# Patient Record
Sex: FEMALE | Race: BLACK OR AFRICAN AMERICAN | NOT HISPANIC OR LATINO | URBAN - METROPOLITAN AREA
[De-identification: names, ages, dates, MRNs, and addresses within clinical notes are randomized per-mention and may not be internally consistent; named-entity substitution may affect disease eponyms.]

---

## 2017-06-22 ENCOUNTER — EMERGENCY (EMERGENCY)
Facility: HOSPITAL | Age: 27
LOS: 1 days | End: 2017-06-22
Payer: MEDICAID

## 2017-06-22 PROCEDURE — 99284 EMERGENCY DEPT VISIT MOD MDM: CPT | Mod: 25

## 2018-05-24 ENCOUNTER — ASOB RESULT (OUTPATIENT)
Age: 28
End: 2018-05-24

## 2018-05-24 ENCOUNTER — APPOINTMENT (OUTPATIENT)
Dept: ANTEPARTUM | Facility: CLINIC | Age: 28
End: 2018-05-24
Payer: MEDICAID

## 2018-05-24 PROCEDURE — 76811 OB US DETAILED SNGL FETUS: CPT

## 2018-08-19 PROCEDURE — 99284 EMERGENCY DEPT VISIT MOD MDM: CPT | Mod: 25

## 2018-08-20 ENCOUNTER — OUTPATIENT (OUTPATIENT)
Dept: OUTPATIENT SERVICES | Facility: HOSPITAL | Age: 28
LOS: 1 days | Discharge: HOME | End: 2018-08-20
Payer: MEDICAID

## 2018-08-20 PROCEDURE — 76815 OB US LIMITED FETUS(S): CPT | Mod: 26

## 2018-09-11 ENCOUNTER — INPATIENT (INPATIENT)
Facility: HOSPITAL | Age: 28
LOS: 0 days | Discharge: HOME | DRG: 781 | End: 2018-09-12
Attending: STUDENT IN AN ORGANIZED HEALTH CARE EDUCATION/TRAINING PROGRAM | Admitting: STUDENT IN AN ORGANIZED HEALTH CARE EDUCATION/TRAINING PROGRAM
Payer: COMMERCIAL

## 2018-09-11 ENCOUNTER — OUTPATIENT (OUTPATIENT)
Dept: OUTPATIENT SERVICES | Facility: HOSPITAL | Age: 28
LOS: 1 days | Discharge: ANOTHER HOSPITAL | End: 2018-09-11
Payer: MEDICAID

## 2018-09-11 VITALS — HEIGHT: 63 IN | WEIGHT: 182.98 LBS

## 2018-09-11 DIAGNOSIS — O47.03 FALSE LABOR BEFORE 37 COMPLETED WEEKS OF GESTATION, THIRD TRIMESTER: ICD-10-CM

## 2018-09-11 LAB
ALLERGY+IMMUNOLOGY DIAG STUDY NOTE: SIGNIFICANT CHANGE UP
AMPHET UR-MCNC: NEGATIVE — SIGNIFICANT CHANGE UP
ANISOCYTOSIS BLD QL: SLIGHT — SIGNIFICANT CHANGE UP
APPEARANCE UR: CLEAR — SIGNIFICANT CHANGE UP
BARBITURATES UR SCN-MCNC: NEGATIVE — SIGNIFICANT CHANGE UP
BASOPHILS # BLD AUTO: 0 K/UL — SIGNIFICANT CHANGE UP (ref 0–0.2)
BASOPHILS NFR BLD AUTO: 0.2 % — SIGNIFICANT CHANGE UP (ref 0–2)
BENZODIAZ UR-MCNC: NEGATIVE — SIGNIFICANT CHANGE UP
BILIRUB UR-MCNC: NEGATIVE — SIGNIFICANT CHANGE UP
BLD GP AB SCN SERPL QL: ABNORMAL
COCAINE METAB.OTHER UR-MCNC: POSITIVE
COLOR SPEC: YELLOW — SIGNIFICANT CHANGE UP
DIFF PNL FLD: NEGATIVE — SIGNIFICANT CHANGE UP
DIR ANTIGLOB POLYSPECIFIC INTERPRETATION: SIGNIFICANT CHANGE UP
ELLIPTOCYTES BLD QL SMEAR: SLIGHT — SIGNIFICANT CHANGE UP
EOSINOPHIL # BLD AUTO: 0.1 K/UL — SIGNIFICANT CHANGE UP (ref 0–0.5)
EOSINOPHIL NFR BLD AUTO: 1.3 % — SIGNIFICANT CHANGE UP (ref 0–6)
EPI CELLS # UR: ABNORMAL
GLUCOSE UR QL: NEGATIVE MG/DL — SIGNIFICANT CHANGE UP
HCT VFR BLD CALC: 34.8 % — LOW (ref 37–47)
HGB BLD-MCNC: 10.1 G/DL — LOW (ref 12–16)
HIV 1 & 2 AB SERPL IA.RAPID: SIGNIFICANT CHANGE UP
KETONES UR-MCNC: NEGATIVE — SIGNIFICANT CHANGE UP
LEUKOCYTE ESTERASE UR-ACNC: ABNORMAL
LYMPHOCYTES # BLD AUTO: 2 K/UL — SIGNIFICANT CHANGE UP (ref 1–4.8)
LYMPHOCYTES # BLD AUTO: 21.9 % — SIGNIFICANT CHANGE UP (ref 20–55)
MACROCYTES BLD QL: SLIGHT — SIGNIFICANT CHANGE UP
MCHC RBC-ENTMCNC: 22.3 PG — LOW (ref 27–31)
MCHC RBC-ENTMCNC: 29 G/DL — LOW (ref 32–36)
MCV RBC AUTO: 77 FL — LOW (ref 81–99)
METHADONE UR-MCNC: NEGATIVE — SIGNIFICANT CHANGE UP
MICROCYTES BLD QL: SLIGHT — SIGNIFICANT CHANGE UP
MONOCYTES # BLD AUTO: 0.7 K/UL — SIGNIFICANT CHANGE UP (ref 0–0.8)
MONOCYTES NFR BLD AUTO: 7.3 % — SIGNIFICANT CHANGE UP (ref 3–10)
NEUTROPHILS # BLD AUTO: 6.3 K/UL — SIGNIFICANT CHANGE UP (ref 1.8–8)
NEUTROPHILS NFR BLD AUTO: 68.8 % — SIGNIFICANT CHANGE UP (ref 37–73)
NITRITE UR-MCNC: NEGATIVE — SIGNIFICANT CHANGE UP
OPIATES UR-MCNC: NEGATIVE — SIGNIFICANT CHANGE UP
OVALOCYTES BLD QL SMEAR: SLIGHT — SIGNIFICANT CHANGE UP
PCP SPEC-MCNC: SIGNIFICANT CHANGE UP
PCP UR-MCNC: NEGATIVE — SIGNIFICANT CHANGE UP
PH UR: 7 — SIGNIFICANT CHANGE UP (ref 5–8)
PLAT MORPH BLD: NORMAL — SIGNIFICANT CHANGE UP
PLATELET # BLD AUTO: 286 K/UL — SIGNIFICANT CHANGE UP (ref 150–400)
POIKILOCYTOSIS BLD QL AUTO: SLIGHT — SIGNIFICANT CHANGE UP
POLYCHROMASIA BLD QL SMEAR: SLIGHT — SIGNIFICANT CHANGE UP
PROT UR-MCNC: NEGATIVE MG/DL — SIGNIFICANT CHANGE UP
RBC # BLD: 4.52 M/UL — SIGNIFICANT CHANGE UP (ref 4.4–5.2)
RBC # FLD: 21.9 % — HIGH (ref 11–15.6)
RBC BLD AUTO: ABNORMAL
RBC CASTS # UR COMP ASSIST: NEGATIVE /HPF — SIGNIFICANT CHANGE UP (ref 0–4)
SCHISTOCYTES BLD QL AUTO: SLIGHT — SIGNIFICANT CHANGE UP
SP GR SPEC: 1 — LOW (ref 1.01–1.02)
THC UR QL: NEGATIVE — SIGNIFICANT CHANGE UP
TYPE + AB SCN PNL BLD: SIGNIFICANT CHANGE UP
UROBILINOGEN FLD QL: NEGATIVE MG/DL — SIGNIFICANT CHANGE UP
WBC # BLD: 9.2 K/UL — SIGNIFICANT CHANGE UP (ref 4.8–10.8)
WBC # FLD AUTO: 9.2 K/UL — SIGNIFICANT CHANGE UP (ref 4.8–10.8)
WBC UR QL: SIGNIFICANT CHANGE UP

## 2018-09-11 PROCEDURE — 76818 FETAL BIOPHYS PROFILE W/NST: CPT | Mod: 26

## 2018-09-11 PROCEDURE — 99253 IP/OBS CNSLTJ NEW/EST LOW 45: CPT | Mod: GC

## 2018-09-11 PROCEDURE — 99283 EMERGENCY DEPT VISIT LOW MDM: CPT

## 2018-09-11 PROCEDURE — 86077 PHYS BLOOD BANK SERV XMATCH: CPT

## 2018-09-11 RX ORDER — SODIUM CHLORIDE 9 MG/ML
1000 INJECTION, SOLUTION INTRAVENOUS ONCE
Qty: 0 | Refills: 0 | Status: DISCONTINUED | OUTPATIENT
Start: 2018-09-11 | End: 2018-09-12

## 2018-09-11 RX ORDER — OXYTOCIN 10 UNIT/ML
333.33 VIAL (ML) INJECTION
Qty: 20 | Refills: 0 | Status: DISCONTINUED | OUTPATIENT
Start: 2018-09-11 | End: 2018-09-12

## 2018-09-11 RX ORDER — CITRIC ACID/SODIUM CITRATE 300-500 MG
30 SOLUTION, ORAL ORAL ONCE
Qty: 0 | Refills: 0 | Status: DISCONTINUED | OUTPATIENT
Start: 2018-09-11 | End: 2018-09-12

## 2018-09-11 RX ORDER — SODIUM CHLORIDE 9 MG/ML
1000 INJECTION, SOLUTION INTRAVENOUS
Qty: 0 | Refills: 0 | Status: DISCONTINUED | OUTPATIENT
Start: 2018-09-11 | End: 2018-09-12

## 2018-09-11 RX ORDER — BUTORPHANOL TARTRATE 2 MG/ML
2 INJECTION, SOLUTION INTRAMUSCULAR; INTRAVENOUS ONCE
Qty: 0 | Refills: 0 | Status: DISCONTINUED | OUTPATIENT
Start: 2018-09-11 | End: 2018-09-11

## 2018-09-11 RX ADMIN — BUTORPHANOL TARTRATE 2 MILLIGRAM(S): 2 INJECTION, SOLUTION INTRAMUSCULAR; INTRAVENOUS at 22:54

## 2018-09-11 RX ADMIN — BUTORPHANOL TARTRATE 2 MILLIGRAM(S): 2 INJECTION, SOLUTION INTRAMUSCULAR; INTRAVENOUS at 23:05

## 2018-09-11 RX ADMIN — SODIUM CHLORIDE 125 MILLILITER(S): 9 INJECTION, SOLUTION INTRAVENOUS at 21:01

## 2018-09-11 RX ADMIN — Medication 12 MILLIGRAM(S): at 21:38

## 2018-09-11 RX ADMIN — SODIUM CHLORIDE 125 MILLILITER(S): 9 INJECTION, SOLUTION INTRAVENOUS at 22:00

## 2018-09-11 NOTE — PATIENT PROFILE OB - ABILITY TO HEAR (WITH HEARING AID OR HEARING APPLIANCE IF NORMALLY USED):
[address] [id number] [phone]          April 5, 2018    RE:  Zuri Reza                                                                                                                                                       [address] [place] [id number]            To whom it may concern:    Zuri Reza is under my professional care for    Carpal tunnel syndrome of right wrist  Postoperative visit         Cannot return to work at this time.  Office recheck in 3 weeks.    Sincerely,        Davonte Cooley MD     Adequate: hears normal conversation without difficulty

## 2018-09-12 DIAGNOSIS — O99.333 SMOKING (TOBACCO) COMPLICATING PREGNANCY, THIRD TRIMESTER: ICD-10-CM

## 2018-09-12 DIAGNOSIS — Z3A.35 35 WEEKS GESTATION OF PREGNANCY: ICD-10-CM

## 2018-09-12 DIAGNOSIS — Z67.91 UNSPECIFIED BLOOD TYPE, RH NEGATIVE: ICD-10-CM

## 2018-09-12 DIAGNOSIS — Z29.9 ENCOUNTER FOR PROPHYLACTIC MEASURES, UNSPECIFIED: ICD-10-CM

## 2018-09-12 DIAGNOSIS — O99.013 ANEMIA COMPLICATING PREGNANCY, THIRD TRIMESTER: ICD-10-CM

## 2018-09-12 DIAGNOSIS — O99.213 OBESITY COMPLICATING PREGNANCY, THIRD TRIMESTER: ICD-10-CM

## 2018-09-12 DIAGNOSIS — O99.323 DRUG USE COMPLICATING PREGNANCY, THIRD TRIMESTER: ICD-10-CM

## 2018-09-12 DIAGNOSIS — O47.03 FALSE LABOR BEFORE 37 COMPLETED WEEKS OF GESTATION, THIRD TRIMESTER: ICD-10-CM

## 2018-09-12 LAB
HBV SURFACE AG SERPL QL IA: SIGNIFICANT CHANGE UP
T PALLIDUM AB TITR SER: NEGATIVE — SIGNIFICANT CHANGE UP

## 2018-09-12 RX ORDER — FERROUS SULFATE 325(65) MG
325 TABLET ORAL DAILY
Qty: 0 | Refills: 0 | Status: DISCONTINUED | OUTPATIENT
Start: 2018-09-12 | End: 2018-09-12

## 2018-09-12 RX ORDER — SODIUM CHLORIDE 9 MG/ML
3 INJECTION INTRAMUSCULAR; INTRAVENOUS; SUBCUTANEOUS EVERY 8 HOURS
Qty: 0 | Refills: 0 | Status: DISCONTINUED | OUTPATIENT
Start: 2018-09-12 | End: 2018-09-12

## 2018-09-12 RX ADMIN — Medication 1 TABLET(S): at 19:54

## 2018-09-12 RX ADMIN — Medication 325 MILLIGRAM(S): at 19:54

## 2018-09-12 RX ADMIN — SODIUM CHLORIDE 3 MILLILITER(S): 9 INJECTION INTRAMUSCULAR; INTRAVENOUS; SUBCUTANEOUS at 19:31

## 2018-09-12 RX ADMIN — SODIUM CHLORIDE 125 MILLILITER(S): 9 INJECTION, SOLUTION INTRAVENOUS at 06:06

## 2018-09-12 NOTE — DISCHARGE NOTE ANTEPARTUM - CARE PLAN
Principal Discharge DX:	Premature uterine contractions causing threatened premature labor, third trimester  Goal:	Rapid Recovery  Assessment and plan of treatment:	S/P 2 shots of Betamethasone. Patient should transition to regular activity level. Resume regular diet. Patient should follow up with her OB for a checkup in 1 week. Patient should call her doctor sooner if she develops a fever or uncontrolled vaginal bleeding.  Secondary Diagnosis:	Cocaine use complicating pregnancy, third trimester  Goal:	Substance Abuse Prevention  Assessment and plan of treatment:	Pt counseled at lengths regarding the maternal and fetal risks and consequences of substance abuse with cocaine. She adamantly denies any use. Her urine tox was positive for cocaine metabolites.

## 2018-09-12 NOTE — CONSULT NOTE ADULT - ASSESSMENT
A 27year old  LMP 18 EDC 10/13/18 at Gestational Age 35 4/7 wks. She is a current cocaine and tobacco user. She has premature uterine contractions. She in Rh- unsensitized and overweight. She has anemia during pregnancy.

## 2018-09-12 NOTE — DISCHARGE NOTE ANTEPARTUM - HOSPITAL COURSE
Pt was transferred from [facility] for a non-reassuring fetal heart tracing, a BPP 4/10, and positive urine toxicology for cocaine use. She was also found to have pre-term contractions and was given two doses of steroids to promote fetal lung maturity.

## 2018-09-12 NOTE — CONSULT NOTE ADULT - PROBLEM SELECTOR RECOMMENDATION 3
Most likely due to cocaine use. No documented cervical change during hospitalization. In the event of active labor, patient will not be given tocolysis. Agree with current steroid treatment to promote fetal lung maturity.

## 2018-09-12 NOTE — CONSULT NOTE ADULT - PROBLEM SELECTOR RECOMMENDATION 9
Patient counseled on the effects of cocaine use in pregnancy. She was told that cocaine use is associated with birth defects. Patient stated that no birth defects were noted on fetal ultrasound examination. It has been associated with fetal growth restriction, abruptio placenta, premature rupture of membranes and still birth. Possible maternal complications include stroke, MI and sudden death. Patient advised not use cocaine during remainder of the pregnancy. Patient has no vaginal bleeding. Premature uterine contractions could be secondary to her cocaine use.

## 2018-09-12 NOTE — CONSULT NOTE ADULT - PROBLEM SELECTOR RECOMMENDATION 4
Current user. Advised to quit smoking during pregnancy. Patient told that tobacco use in pregnancy has been associated with low birth weight infants,  births, abruptio placenta and still births.

## 2018-09-12 NOTE — DISCHARGE NOTE ANTEPARTUM - CARE PROVIDER_API CALL
Jez Sterling), Obstetrics and Gynecology  [address] [place] [address]  Phone: [phone]  Fax: [phone]

## 2018-09-12 NOTE — DISCHARGE NOTE ANTEPARTUM - PATIENT PORTAL LINK FT
You can access the [facility] Patient Portal, offered by [facility], by registering with the following website: [email or web address]

## 2018-09-12 NOTE — CONSULT NOTE ADULT - SUBJECTIVE AND OBJECTIVE BOX
JOI GROSS  A 27year old  LMP 18   EDC 10/13/18 at Gestational Age      26 YO  at 35 4/7 wks sent from [facility] due to non-reassuring NST, premature contractions and BPP score of 4/8. Patient has a history of drug and alcohol use in current pregnancy. BPP on admission 6/10 and Utox positive for cocaine. Patient also has a history inadequate number of pre- care visits.  Patient has a history of iron deficiency anemia and is s/p 2 units PRBCs 2 weeks ago. Patient receives weekly iron infusions. Blood type A-, s/p Rhogam at 28 weeks. Current FHT is reassuring (Category I), contractions irregular. Patient is s/p 1 dose betamethasone, 2nd dose pending tonight.     REVIEW OF SYSTEMS:  CONSTITUTIONAL: No weakness, fevers or chills  EYES/ENT: No visual changes;  No vertigo or throat pain   NECK: No pain or stiffness  RESPIRATORY: No cough, wheezing, hemoptysis; No shortness of breath  CARDIOVASCULAR: No chest pain or palpitations  GASTROINTESTINAL: No abdominal or epigastric pain. No nausea, vomiting, or hematemesis; No diarrhea or constipation. No melena or hematochezia.  GENITOURINARY: No dysuria, frequency or hematuria  NEUROLOGICAL: No numbness or weakness  SKIN: No itching, burning, rashes, or lesions   All other review of systems is negative unless indicated above.    PAST MEDICAL & SURGICAL HISTORY:  Uterine fibroid  Anemia  No significant past surgical history      Home medications: PNV, weekly iron infusions    Allergies:  No Known Allergies    FAMILY HISTORY: Mother ( from [place]), Dad ( from stroke)    Social History: Denies ETOH, Current smoker and cocaine user     Past OB/GYN Hx: X2 first trimester pregnancy abortions     Vital Signs:  T(C): 36.7  HR: 61  BP: 123/81  RR: 16  BMI: 32.4    Physical Exam:  General: Adult female in NAD  CVS: RRR, +S1/S2, no murmurs  Lungs: CTAB, no wheezing, rhonchi or rales  Abdomen: soft, non-tender, gravid uterus  Pelvic: Deferred  Ext: No cyanosis, edema or calf tenderness, +2 pulses  Skin: No rashes or lesions on exposed skin  Neuro: Normal DTRs, grossly intact    Fetal heart tracing reviewed: FHR baseline 120bpm, with moderate variation and accelerations. No decelerations. Mild to moderate uterine contractions every 3-5 minutes apart.     Labs:                     10.1   9.2   )-----------( 286      ( 11 Sep 2018 22:03 )             34.8       MEDICATIONS  (STANDING):  citric acid/sodium citrate Solution 30 milliLiter(s) Oral once  ferrous    sulfate 325 milliGRAM(s) Oral daily  lactated ringers Bolus 1000 milliLiter(s) IV Bolus once  lactated ringers. 1000 milliLiter(s) (125 mL/Hr) IV Continuous <Continuous>  oxytocin Infusion 333.333 milliUNIT(s)/Min (1000 mL/Hr) IV Continuous <Continuous>  prenatal multivitamin 1 Tablet(s) Oral daily JOI GROSS  A 27year old  LMP 18   EDC 10/13/18 at Gestational Age      26 YO  at 35 4/7 wks sent from [facility] due to non-reassuring NST, premature contractions and BPP score of 4/8. Patient has a history of drug and alcohol use in current pregnancy. BPP on admission 6/10 and Utox positive for cocaine. Patient also has a history inadequate number of pre- care visits.  Patient has a history of iron deficiency anemia and is s/p 2 units PRBCs 2 weeks ago. Patient receives weekly iron infusions. Blood type A-, s/p Rhogam at 28 weeks. Current FHT is reassuring (Category I), contractions irregular. Patient is s/p 1 dose betamethasone, 2nd dose pending tonight.     REVIEW OF SYSTEMS:  CONSTITUTIONAL: No weakness, fevers or chills  EYES/ENT: No visual changes;  No vertigo or throat pain   NECK: No pain or stiffness  RESPIRATORY: No cough, wheezing, hemoptysis; No shortness of breath  CARDIOVASCULAR: No chest pain or palpitations  GASTROINTESTINAL: No abdominal or epigastric pain. No nausea, vomiting, or hematemesis; No diarrhea or constipation. No melena or hematochezia.  GENITOURINARY: No dysuria, frequency or hematuria  NEUROLOGICAL: No numbness or weakness  SKIN: No itching, burning, rashes, or lesions   All other review of systems is negative unless indicated above.    PAST MEDICAL & SURGICAL HISTORY:  Uterine fibroid  Anemia  No significant past surgical history      Home medications: PNV, weekly iron infusions    Allergies:  No Known Allergies    FAMILY HISTORY: Mother ( from [place]), Dad ( from stroke)    Social History: Denies ETOH, Current smoker and cocaine user     Past OB/GYN Hx: X2 first trimester pregnancy abortions     Vital Signs:  T(C): 36.7  HR: 61  BP: 123/81  RR: 16  BMI: 32.4    Physical Exam:  General: Adult female in NAD  CVS: RRR, +S1/S2, no murmurs  Lungs: CTAB, no wheezing, rhonchi or rales  Abdomen: soft, non-tender, gravid uterus  Pelvic: Deferred  Ext: No cyanosis, edema or calf tenderness, +2 pulses  Skin: No rashes or lesions on exposed skin  Neuro: Normal DTRs, grossly intact    Fetal heart tracing reviewed: FHR baseline 120bpm, with moderate variation and accelerations. No decelerations. Mild to moderate uterine contractions every 3-5 minutes apart.     Labs:                     10.1   9.2   )-----------( 286      ( 11 Sep 2018 22:03 )             34.8     Urine Microscopic-Add On (NC) (18 @ 22:03)    Epithelial Cells: Moderate    Red Blood Cell - Urine: Negative /HPF    White Blood Cell - Urine: 0-2    Methadone, Urine (18 @ 22:03)    Methadone, Urine: Negative    Cocaine Metabolite, Urine (18 @ 22:03)    Cocaine Metabolite, Urine: Positive    Type + Screen (18 @ 22:03)    Type + Screen: A NEG    Antibody Identification (18 @ 22:03)    Antibody Interpretation 1: Anti-D due to RhIG Admin      MEDICATIONS  (STANDING):  citric acid/sodium citrate Solution 30 milliLiter(s) Oral once  ferrous    sulfate 325 milliGRAM(s) Oral daily  lactated ringers Bolus 1000 milliLiter(s) IV Bolus once  lactated ringers. 1000 milliLiter(s) (125 mL/Hr) IV Continuous <Continuous>  oxytocin Infusion 333.333 milliUNIT(s)/Min (1000 mL/Hr) IV Continuous <Continuous>  prenatal multivitamin 1 Tablet(s) Oral daily JOI GROSS  A 27 year old  LMP 18 EDC 10/13/18 at 35 weeks and 4 days Gestational Age.    26 YO  at 35 4/7 wks sent from [facility] due to non-reassuring NST, premature contractions and BPP score of 4/8. Patient has a history of drug and alcohol use in current pregnancy. BPP on admission 6/10 and Utox positive for cocaine. Patient also has a history inadequate number of pre- care visits.  Patient has a history of iron deficiency anemia and is s/p 2 units PRBCs 2 weeks ago. Patient receives weekly iron infusions. Blood type A-, s/p Rhogam at 28 weeks. Current FHT is reassuring (Category I), contractions irregular. Patient is s/p 1 dose betamethasone, 2nd dose pending tonight.     REVIEW OF SYSTEMS:  CONSTITUTIONAL: No weakness, fevers or chills  EYES/ENT: No visual changes;  No vertigo or throat pain   NECK: No pain or stiffness  RESPIRATORY: No cough, wheezing, hemoptysis; No shortness of breath  CARDIOVASCULAR: No chest pain or palpitations  GASTROINTESTINAL: No abdominal or epigastric pain. No nausea, vomiting, or hematemesis; No diarrhea or constipation. No melena or hematochezia.  GENITOURINARY: No dysuria, frequency or hematuria  NEUROLOGICAL: No numbness or weakness  SKIN: No itching, burning, rashes, or lesions   All other review of systems is negative unless indicated above.    PAST MEDICAL & SURGICAL HISTORY:  Uterine fibroid  Anemia  No significant past surgical history    Home medications: PNV, weekly iron infusions    Allergies:  No Known Allergies    FAMILY HISTORY: Mother ( from [place]), Dad ( from stroke)    Social History: Denies ETOH, Current smoker and cocaine user     Past OB/GYN Hx: X2 first trimester pregnancy abortions     Vital Signs:  T(C): 36.7  HR: 61  BP: 123/81  RR: 16  BMI: 32.4    Physical Exam:  General: Adult female in NAD  CVS: RRR, +S1/S2, no murmurs  Lungs: CTAB, no wheezing, rhonchi or rales  Abdomen: soft, non-tender, gravid uterus  Pelvic: Deferred  Ext: No cyanosis, edema or calf tenderness, +2 pulses  Skin: No rashes or lesions on exposed skin  Neuro: Normal DTRs, grossly intact    Fetal heart tracing reviewed: FHR baseline 120bpm, with moderate variation and accelerations. No decelerations. Mild to moderate uterine contractions every 3-5 minutes apart.     Labs:                     10.1   9.2   )-----------( 286      ( 11 Sep 2018 22:03 )             34.8     Urine Microscopic-Add On (NC) (18 @ 22:03)    Epithelial Cells: Moderate    Red Blood Cell - Urine: Negative /HPF    White Blood Cell - Urine: 0-2    Methadone, Urine (18 @ 22:03)    Methadone, Urine: Negative    Cocaine Metabolite, Urine (18 @ 22:03)    Cocaine Metabolite, Urine: Positive    Type + Screen (18 @ 22:03)    Type + Screen: A NEG    Antibody Identification (18 @ 22:03)    Antibody Interpretation 1: Anti-D due to RhIG Admin      MEDICATIONS  (STANDING):  citric acid/sodium citrate Solution 30 milliLiter(s) Oral once  ferrous    sulfate 325 milliGRAM(s) Oral daily  lactated ringers Bolus 1000 milliLiter(s) IV Bolus once  lactated ringers. 1000 milliLiter(s) (125 mL/Hr) IV Continuous <Continuous>  oxytocin Infusion 333.333 milliUNIT(s)/Min (1000 mL/Hr) IV Continuous <Continuous>  prenatal multivitamin 1 Tablet(s) Oral daily

## 2018-09-12 NOTE — DISCHARGE NOTE ANTEPARTUM - PLAN OF CARE
Rapid Recovery S/P 2 shots of Betamethasone. Patient should transition to regular activity level. Resume regular diet. Patient should follow up with her OB for a checkup in 1 week. Patient should call her doctor sooner if she develops a fever or uncontrolled vaginal bleeding. Substance Abuse Prevention Pt counseled at lengths regarding the maternal and fetal risks and consequences of substance abuse with cocaine. She adamantly denies any use. Her urine tox was positive for cocaine metabolites.

## 2018-09-12 NOTE — CONSULT NOTE ADULT - PROBLEM SELECTOR RECOMMENDATION 8
Agree with current steroid treatment to promote fetal lung maturity. Fetal heart rate tracing is category I suggestive of healthy fetus at this time. OB US to be done today.

## 2018-09-12 NOTE — CONSULT NOTE ADULT - PROBLEM SELECTOR RECOMMENDATION 6
Obesity has been associated with an increased risk for  birth,  deliveries, operative complications and still births. Suggest continue close fetal and maternal monitoring during hospitalization.

## 2018-09-12 NOTE — DISCHARGE NOTE ANTEPARTUM - MEDICATION SUMMARY - MEDICATIONS TO TAKE
I will START or STAY ON the medications listed below when I get home from the hospital:    Prenatal Multivitamins with Folic Acid 1 mg oral tablet  -- 1 tab(s) by mouth once a day  -- Indication: For 35 weeks gestation of pregnancy

## 2018-09-12 NOTE — CONSULT NOTE ADULT - PROBLEM SELECTOR RECOMMENDATION 2
Patient told that anemia during pregnancy is associated with  birth and low birth weight infants. Recommends daily prenatal vitamins and iron supplementation during hospitalization.

## 2018-09-12 NOTE — CONSULT NOTE ADULT - PROBLEM SELECTOR RECOMMENDATION 5
Patient advised to receive postpartum Rhogam if infant is found to be RhD+ Received antepartum Rhogam. Patient advised to receive postpartum Rhogam if infant is found to be RhD+.

## 2018-09-13 LAB
CANDIDA AB TITR SER: SIGNIFICANT CHANGE UP
G VAGINALIS DNA SPEC QL NAA+PROBE: DETECTED
RUBV IGG SER-ACNC: 2.1 INDEX — SIGNIFICANT CHANGE UP
RUBV IGG SER-IMP: POSITIVE — SIGNIFICANT CHANGE UP
T VAGINALIS SPEC QL WET PREP: SIGNIFICANT CHANGE UP

## 2018-09-27 ENCOUNTER — OUTPATIENT (OUTPATIENT)
Dept: OUTPATIENT SERVICES | Facility: HOSPITAL | Age: 28
LOS: 1 days | End: 2018-09-27

## 2018-09-29 ENCOUNTER — OUTPATIENT (OUTPATIENT)
Dept: OUTPATIENT SERVICES | Facility: HOSPITAL | Age: 28
LOS: 1 days | End: 2018-09-29
Payer: MEDICAID

## 2018-09-29 ENCOUNTER — OUTPATIENT (OUTPATIENT)
Dept: OUTPATIENT SERVICES | Facility: HOSPITAL | Age: 28
LOS: 1 days | End: 2018-09-29

## 2018-09-29 PROCEDURE — 76815 OB US LIMITED FETUS(S): CPT | Mod: 26

## 2018-09-29 PROCEDURE — 76818 FETAL BIOPHYS PROFILE W/NST: CPT | Mod: 26

## 2018-10-09 ENCOUNTER — OUTPATIENT (OUTPATIENT)
Dept: OUTPATIENT SERVICES | Facility: HOSPITAL | Age: 28
LOS: 1 days | Discharge: HOME | End: 2018-10-09
Payer: MEDICAID

## 2018-10-09 PROCEDURE — 99284 EMERGENCY DEPT VISIT MOD MDM: CPT

## 2018-10-12 ENCOUNTER — OUTPATIENT (OUTPATIENT)
Dept: OUTPATIENT SERVICES | Facility: HOSPITAL | Age: 28
LOS: 1 days | Discharge: HOME | End: 2018-10-12
Payer: MEDICAID

## 2018-10-12 PROCEDURE — 99285 EMERGENCY DEPT VISIT HI MDM: CPT | Mod: 25

## 2018-10-16 ENCOUNTER — INPATIENT (INPATIENT)
Facility: HOSPITAL | Age: 28
LOS: 4 days | Discharge: HOME | End: 2018-10-21
Attending: OBSTETRICS & GYNECOLOGY | Admitting: OBSTETRICS & GYNECOLOGY
Payer: MEDICAID

## 2018-10-16 PROCEDURE — 76815 OB US LIMITED FETUS(S): CPT | Mod: 26

## 2018-12-26 PROCEDURE — 76817 TRANSVAGINAL US OBSTETRIC: CPT

## 2018-12-26 PROCEDURE — 86880 COOMBS TEST DIRECT: CPT

## 2018-12-26 PROCEDURE — 87800 DETECT AGNT MULT DNA DIREC: CPT

## 2018-12-26 PROCEDURE — 81001 URINALYSIS AUTO W/SCOPE: CPT

## 2018-12-26 PROCEDURE — 93975 VASCULAR STUDY: CPT

## 2018-12-26 PROCEDURE — 86900 BLOOD TYPING SEROLOGIC ABO: CPT

## 2018-12-26 PROCEDURE — 86870 RBC ANTIBODY IDENTIFICATION: CPT

## 2018-12-26 PROCEDURE — 87653 STREP B DNA AMP PROBE: CPT

## 2018-12-26 PROCEDURE — 76819 FETAL BIOPHYS PROFIL W/O NST: CPT

## 2018-12-26 PROCEDURE — 85027 COMPLETE CBC AUTOMATED: CPT

## 2018-12-26 PROCEDURE — 87340 HEPATITIS B SURFACE AG IA: CPT

## 2018-12-26 PROCEDURE — 86762 RUBELLA ANTIBODY: CPT

## 2018-12-26 PROCEDURE — 86780 TREPONEMA PALLIDUM: CPT

## 2018-12-26 PROCEDURE — 86703 HIV-1/HIV-2 1 RESULT ANTBDY: CPT

## 2018-12-26 PROCEDURE — 86901 BLOOD TYPING SEROLOGIC RH(D): CPT

## 2018-12-26 PROCEDURE — 76805 OB US >/= 14 WKS SNGL FETUS: CPT

## 2018-12-26 PROCEDURE — 80307 DRUG TEST PRSMV CHEM ANLYZR: CPT

## 2018-12-26 PROCEDURE — 36415 COLL VENOUS BLD VENIPUNCTURE: CPT

## 2018-12-26 PROCEDURE — 86850 RBC ANTIBODY SCREEN: CPT

## 2019-12-04 ENCOUNTER — EMERGENCY (EMERGENCY)
Facility: HOSPITAL | Age: 29
LOS: 1 days | End: 2019-12-04
Admitting: EMERGENCY MEDICINE
Payer: MEDICAID

## 2019-12-04 PROCEDURE — 99284 EMERGENCY DEPT VISIT MOD MDM: CPT

## 2020-07-23 ENCOUNTER — EMERGENCY (EMERGENCY)
Facility: HOSPITAL | Age: 30
LOS: 1 days | End: 2020-07-23
Admitting: EMERGENCY MEDICINE
Payer: MEDICAID

## 2020-07-23 PROCEDURE — 71045 X-RAY EXAM CHEST 1 VIEW: CPT | Mod: 26

## 2020-07-23 PROCEDURE — 99285 EMERGENCY DEPT VISIT HI MDM: CPT

## 2021-06-26 ENCOUNTER — EMERGENCY (EMERGENCY)
Facility: HOSPITAL | Age: 31
LOS: 1 days | End: 2021-06-26
Admitting: EMERGENCY MEDICINE
Payer: MEDICAID

## 2021-06-26 PROCEDURE — 99285 EMERGENCY DEPT VISIT HI MDM: CPT

## 2021-06-26 PROCEDURE — 99053 MED SERV 10PM-8AM 24 HR FAC: CPT

## 2021-06-27 PROCEDURE — 76801 OB US < 14 WKS SINGLE FETUS: CPT | Mod: 26

## 2021-09-15 ENCOUNTER — OUTPATIENT (OUTPATIENT)
Dept: OUTPATIENT SERVICES | Facility: HOSPITAL | Age: 31
LOS: 1 days | End: 2021-09-15

## 2021-09-22 ENCOUNTER — OUTPATIENT (OUTPATIENT)
Dept: INPATIENT UNIT | Facility: HOSPITAL | Age: 31
LOS: 1 days | End: 2021-09-22
Payer: COMMERCIAL

## 2021-09-22 PROCEDURE — L9996: CPT

## 2021-09-23 PROCEDURE — 99213 OFFICE O/P EST LOW 20 MIN: CPT

## 2021-09-23 PROCEDURE — 76815 OB US LIMITED FETUS(S): CPT | Mod: 26

## 2021-10-04 ENCOUNTER — EMERGENCY (EMERGENCY)
Facility: HOSPITAL | Age: 31
LOS: 1 days | End: 2021-10-04
Admitting: EMERGENCY MEDICINE
Payer: MEDICAID

## 2021-10-04 PROCEDURE — L9991: CPT

## 2022-01-18 ENCOUNTER — INPATIENT (INPATIENT)
Facility: HOSPITAL | Age: 32
LOS: 1 days | Discharge: HOME | End: 2022-01-20
Attending: OBSTETRICS & GYNECOLOGY | Admitting: OBSTETRICS & GYNECOLOGY

## 2022-01-18 ENCOUNTER — OUTPATIENT (OUTPATIENT)
Dept: INPATIENT UNIT | Facility: HOSPITAL | Age: 32
LOS: 1 days | End: 2022-01-18
Payer: MEDICAID

## 2022-01-18 PROCEDURE — L9996: CPT

## 2022-01-27 DIAGNOSIS — U07.1 COVID-19: ICD-10-CM

## 2022-01-27 DIAGNOSIS — O09.33 SUPERVISION OF PREGNANCY WITH INSUFFICIENT ANTENATAL CARE, THIRD TRIMESTER: ICD-10-CM

## 2022-01-27 DIAGNOSIS — Z3A.38 38 WEEKS GESTATION OF PREGNANCY: ICD-10-CM

## 2022-01-27 DIAGNOSIS — D64.9 ANEMIA, UNSPECIFIED: ICD-10-CM

## 2022-08-25 ENCOUNTER — APPOINTMENT (OUTPATIENT)
Dept: OBGYN | Facility: CLINIC | Age: 32
End: 2022-08-25

## 2022-09-14 VITALS — BODY MASS INDEX: 33.66 KG/M2 | WEIGHT: 190 LBS | HEIGHT: 63 IN

## 2022-09-14 DIAGNOSIS — Z86.018 PERSONAL HISTORY OF OTHER BENIGN NEOPLASM: ICD-10-CM

## 2022-09-14 DIAGNOSIS — Z01.818 ENCOUNTER FOR OTHER PREPROCEDURAL EXAMINATION: ICD-10-CM

## 2022-09-20 LAB
BASOPHILS # BLD AUTO: 0.02 K/UL
BASOPHILS NFR BLD AUTO: 0.3 %
EOSINOPHIL # BLD AUTO: 0.53 K/UL
EOSINOPHIL NFR BLD AUTO: 7.9 %
HCT VFR BLD CALC: 29.7 %
HGB BLD-MCNC: 9 G/DL
IMM GRANULOCYTES NFR BLD AUTO: 0.3 %
LYMPHOCYTES # BLD AUTO: 1.63 K/UL
LYMPHOCYTES NFR BLD AUTO: 24.4 %
MAN DIFF?: NORMAL
MCHC RBC-ENTMCNC: 22.9 PG
MCHC RBC-ENTMCNC: 30.3 GM/DL
MCV RBC AUTO: 75.6 FL
MONOCYTES # BLD AUTO: 0.51 K/UL
MONOCYTES NFR BLD AUTO: 7.6 %
NEUTROPHILS # BLD AUTO: 3.98 K/UL
NEUTROPHILS NFR BLD AUTO: 59.5 %
PLATELET # BLD AUTO: 353 K/UL
RBC # BLD: 3.93 M/UL
RBC # FLD: 17.3 %
SARS-COV-2 N GENE NPH QL NAA+PROBE: NOT DETECTED
WBC # FLD AUTO: 6.69 K/UL

## 2022-09-21 ENCOUNTER — TRANSCRIPTION ENCOUNTER (OUTPATIENT)
Age: 32
End: 2022-09-21

## 2022-09-21 ENCOUNTER — APPOINTMENT (OUTPATIENT)
Dept: OBGYN | Facility: CLINIC | Age: 32
End: 2022-09-21
Payer: MEDICAID

## 2022-09-21 VITALS
WEIGHT: 190 LBS | DIASTOLIC BLOOD PRESSURE: 84 MMHG | HEIGHT: 63 IN | SYSTOLIC BLOOD PRESSURE: 128 MMHG | BODY MASS INDEX: 33.66 KG/M2 | HEART RATE: 87 BPM | OXYGEN SATURATION: 98 %

## 2022-09-21 DIAGNOSIS — R11.0 NAUSEA: ICD-10-CM

## 2022-09-21 DIAGNOSIS — D64.9 ANEMIA, UNSPECIFIED: ICD-10-CM

## 2022-09-21 DIAGNOSIS — Z30.09 ENCOUNTER FOR OTHER GENERAL COUNSELING AND ADVICE ON CONTRACEPTION: ICD-10-CM

## 2022-09-21 DIAGNOSIS — Z33.2 ENCOUNTER FOR ELECTIVE TERMINATION OF PREGNANCY: ICD-10-CM

## 2022-09-21 PROCEDURE — 99214 OFFICE O/P EST MOD 30 MIN: CPT | Mod: 25

## 2022-09-21 PROCEDURE — 59200 INSERT CERVICAL DILATOR: CPT

## 2022-09-21 PROCEDURE — 99215 OFFICE O/P EST HI 40 MIN: CPT | Mod: 25

## 2022-09-21 RX ORDER — DOXYCYCLINE HYCLATE 100 MG/1
100 TABLET ORAL
Qty: 2 | Refills: 0 | Status: ACTIVE | COMMUNITY
Start: 2022-09-21 | End: 1900-01-01

## 2022-09-21 RX ORDER — ACETAMINOPHEN AND CODEINE 300; 30 MG/1; MG/1
300-30 TABLET ORAL 3 TIMES DAILY
Qty: 6 | Refills: 0 | Status: ACTIVE | COMMUNITY
Start: 2022-09-21 | End: 1900-01-01

## 2022-09-21 RX ORDER — ONDANSETRON 4 MG/1
4 TABLET ORAL 4 TIMES DAILY
Qty: 20 | Refills: 0 | Status: ACTIVE | COMMUNITY
Start: 2022-09-21 | End: 1900-01-01

## 2022-09-21 RX ORDER — CHOLECALCIFEROL (VITAMIN D3) 10(400)/ML
160 (50 FE) DROPS ORAL TWICE DAILY
Qty: 60 | Refills: 3 | Status: ACTIVE | COMMUNITY
Start: 2022-09-21 | End: 1900-01-01

## 2022-09-21 RX ORDER — IBUPROFEN 600 MG/1
600 TABLET, FILM COATED ORAL 4 TIMES DAILY
Qty: 60 | Refills: 0 | Status: ACTIVE | COMMUNITY
Start: 2022-09-21 | End: 1900-01-01

## 2022-09-21 NOTE — HISTORY OF PRESENT ILLNESS
[FreeTextEntry1] : 30 yo  at 17w6d by LMP 2022 presenting for dilators insertion and preop counseling for scheduled dilation and evacuation. Pt referred from [facility] for anemia.\par \par All: latex, flagyl- tongue swelling\par Meds: denies\par Obhx: \par NSVDx3\par DEx1\par med abx1\par C-secx1 (7 month old)\par \par Gynhx: fibroids\par PSH: as above, ankle surgery\par PMH: chronic anemia\par SH: no tobacco use\par FH: + hx of blood clot in mother, unsure of etiology\par \par \par \par Risks of D&E including:\par 1.	Infection: Patient was counseled on risk of infection and the use of prophylactic antibiotics, signs/symptoms of pre- and post-operative infection were reviewed. \par 2.	Hemorrhage: Patient was counseled on the risk of hemorrhage, possibly requiring blood (and/or blood products) transfusion, management including use of but not limited to uterotonic medications. PT HAS NO OBJECTIONS TO BLOOD TRANSFUSION OR RECEIVING BLOOD PRODUCTS.\par 3.	Injury/Perforation:  Risk of injury to vagina, cervix, uterus reviewed. Patient was counseled on the risk of uterine perforation with/without need for laparoscopy/laparotomy with/without injury to adjacent organs such as bowel/bladder. Reviewed risk of hysterectomy.\par 4.            Risk of retained products of conception  with/without need for medication or suction procedure to empty the uterus. \par \par The risk of harm to subsequent pregnancies or the ability to carry a subsequent pregnancy to term, and adverse psychological effects were discussed.  \par \par Need for cervical ripening with pre-operative dilator placement also discussed; the accompanying risks of infection, bleeding, injury, rupture of membranes, and  labor were reviewed.  They understand these risks and agrees to above. \par \par The patient also understands it is their responsibility to bring to the attention of their physician any unusual symptoms following the  and to report to follow-up examinations.  \par \par They are sure of their decision and deny any coercion from family, friends or healthcare providers. The patient had the opportunity to ask questions and all questions were answered.  \par \par ++++++++++++++++++++++++++++Contraception counseling++++++++++++++++\par Pt interested in Nexplanon. Reviewed all options. Reviewed that estrogen containing methods without knowing mother's history and work up likely not best option at this time.\par \par The patient was counseled on the risks, benefits and alternatives of the subdermal implant.  The patient was counseled that the implant goes under the skin of the arm, it is a thin, matchstick-sized carmen made of plastic that releases the hormone progestin.  This hormone like the hormone made by the body.  It prevents pregnancy by preventing ovulation and thickening cervical mucous, this prevents sperm from getting to eggs.  It is effective for 5 years.  The benefits of the implant are: There nothing the patient has to do prior to sex to make the implant work.  Return to fertility after implant removal is immediate.  It helps with painful menses.  The risks of the implant are: discoloring or a scar on the arm where the implant goes in.  Rarely, arm pain for longer than a few days.  Rarely, an infection or pain in the arm that needs medicine.  Very rarely, an implant may move from the place where it was put in. The side effects of the implant are: nausea (which usually clears up in 2-3 months), sore breasts (usually clears up in 2-3 months), headache, irregular bleeding (including early or late periods, spotting in between menses, or no periods), weight gain, soreness, bruising, or swelling for a few days after the implant is put in.  \par The implant may affect other medications the patient is taking, and the patient was instructed to tell their physicians if medications are changed.  No promise can be made about the outcome of putting in the implant.  \par The patient voiced understanding of the risks of irregular bleeding and the need to use condoms for protection from STIs. \par

## 2022-09-21 NOTE — PROCEDURE
[Transabdominal OB Sonogram] : Transabdominal OB Sonogram [Intrauterine Pregnancy] : intrauterine pregnancy [Transabdominal OB Sonogram WNL] : Transabdominal OB Sonogram WNL [FreeTextEntry1] : Anterior placenta, size consistent with dates. See uploaded images in AS.

## 2022-09-21 NOTE — REASON FOR VISIT
[Initial] : an initial consultation for [FreeTextEntry1] : [facility]

## 2022-09-21 NOTE — PHYSICAL EXAM
[Chaperone Present] : A chaperone was present in the examining room during all aspects of the physical examination [FreeTextEntry1] : Dr. Marlow PGY3 [Appropriately responsive] : appropriately responsive [Alert] : alert [No Acute Distress] : no acute distress [Soft] : soft [Non-tender] : non-tender [Non-distended] : non-distended [FreeTextEntry7] : gravid

## 2022-09-21 NOTE — DISCUSSION/SUMMARY
[FreeTextEntry1] : 30 yo s/p osmotic dilator placement for DE tomorrow. \par \par 1.Dilation and Evacuation \par -All available records and ultrasounds have been reviewed \par -All consents reviewed and/or signed today, all questions/concerns addressed\par - Patient offered fetal footprints- accepted\par \par \par 2. Surgery scheduling\par - Patient to be precertified for D+E\par - D+E scheduled for tomorrow\par - COVID NEG\par \par 3. ID/Cervical prep\par - doxycycline 200 mg in OR\par - Ibuprofen 600 mg po q 6 hours - Rx sent\par - doxycycline 100mg BID x 1 day for day of dilator placement\par \par 4. Labs/Blood type\par - TS on arrival\par - Rhogam pending results\par - Hgb 9\par \par 5. Contraception/Future Pregnancy Plans\par - Patient counseled on all contraceptive options\par - Patient desires intraoop nexplanon\par \par 6. Post-op\par - Post-operative telehealth or in person visit optional- to be scheduled in 2 weeks\par - Post-operative instruction sheet reviewed/given, reviewed bleeding and infection precautions\par - Provided 24 hour contact phone number\par - All questions/concerns of patient addressed to their satisfaction\par

## 2022-09-22 ENCOUNTER — OUTPATIENT (OUTPATIENT)
Dept: INPATIENT UNIT | Facility: HOSPITAL | Age: 32
LOS: 1 days | End: 2022-09-22
Payer: COMMERCIAL

## 2022-09-22 ENCOUNTER — TRANSCRIPTION ENCOUNTER (OUTPATIENT)
Age: 32
End: 2022-09-22

## 2022-09-22 ENCOUNTER — APPOINTMENT (OUTPATIENT)
Dept: OBGYN | Facility: HOSPITAL | Age: 32
End: 2022-09-22

## 2022-09-22 ENCOUNTER — RESULT REVIEW (OUTPATIENT)
Age: 32
End: 2022-09-22

## 2022-09-22 VITALS
HEIGHT: 62 IN | HEART RATE: 88 BPM | RESPIRATION RATE: 15 BRPM | TEMPERATURE: 98 F | SYSTOLIC BLOOD PRESSURE: 120 MMHG | DIASTOLIC BLOOD PRESSURE: 70 MMHG | WEIGHT: 184.97 LBS | OXYGEN SATURATION: 99 %

## 2022-09-22 VITALS
DIASTOLIC BLOOD PRESSURE: 71 MMHG | SYSTOLIC BLOOD PRESSURE: 124 MMHG | HEART RATE: 78 BPM | OXYGEN SATURATION: 97 % | RESPIRATION RATE: 18 BRPM

## 2022-09-22 DIAGNOSIS — Z3A.19 19 WEEKS GESTATION OF PREGNANCY: ICD-10-CM

## 2022-09-22 DIAGNOSIS — Z33.2 ENCOUNTER FOR ELECTIVE TERMINATION OF PREGNANCY: ICD-10-CM

## 2022-09-22 LAB — BLD GP AB SCN SERPL QL: SIGNIFICANT CHANGE UP

## 2022-09-22 PROCEDURE — 88305 TISSUE EXAM BY PATHOLOGIST: CPT

## 2022-09-22 PROCEDURE — 88305 TISSUE EXAM BY PATHOLOGIST: CPT | Mod: 26

## 2022-09-22 PROCEDURE — 86850 RBC ANTIBODY SCREEN: CPT

## 2022-09-22 PROCEDURE — ZZZZZ: CPT

## 2022-09-22 PROCEDURE — 76998 US GUIDE INTRAOP: CPT | Mod: 26

## 2022-09-22 PROCEDURE — 11981 INSERTION DRUG DLVR IMPLANT: CPT | Mod: GC

## 2022-09-22 PROCEDURE — 36415 COLL VENOUS BLD VENIPUNCTURE: CPT

## 2022-09-22 PROCEDURE — 86900 BLOOD TYPING SEROLOGIC ABO: CPT

## 2022-09-22 PROCEDURE — 59841 INDUCED ABORTION DILAT&EVAC: CPT | Mod: GC

## 2022-09-22 PROCEDURE — 86901 BLOOD TYPING SEROLOGIC RH(D): CPT

## 2022-09-22 PROCEDURE — 59841 INDUCED ABORTION DILAT&EVAC: CPT

## 2022-09-22 RX ORDER — HYDROMORPHONE HYDROCHLORIDE 2 MG/ML
0.5 INJECTION INTRAMUSCULAR; INTRAVENOUS; SUBCUTANEOUS
Refills: 0 | Status: DISCONTINUED | OUTPATIENT
Start: 2022-09-22 | End: 2022-09-22

## 2022-09-22 RX ORDER — ACETAMINOPHEN 500 MG
975 TABLET ORAL ONCE
Refills: 0 | Status: COMPLETED | OUTPATIENT
Start: 2022-09-22 | End: 2022-09-22

## 2022-09-22 RX ORDER — FENTANYL CITRATE 50 UG/ML
25 INJECTION INTRAVENOUS
Refills: 0 | Status: DISCONTINUED | OUTPATIENT
Start: 2022-09-22 | End: 2022-09-22

## 2022-09-22 RX ORDER — ETONOGESTREL 68 MG/1
68 IMPLANT SUBCUTANEOUS ONCE
Refills: 0 | Status: DISCONTINUED | OUTPATIENT
Start: 2022-09-22 | End: 2022-09-22

## 2022-09-22 RX ORDER — SODIUM CHLORIDE 9 MG/ML
3 INJECTION INTRAMUSCULAR; INTRAVENOUS; SUBCUTANEOUS ONCE
Refills: 0 | Status: DISCONTINUED | OUTPATIENT
Start: 2022-09-22 | End: 2022-09-22

## 2022-09-22 RX ORDER — ONDANSETRON 8 MG/1
4 TABLET, FILM COATED ORAL ONCE
Refills: 0 | Status: DISCONTINUED | OUTPATIENT
Start: 2022-09-22 | End: 2022-09-22

## 2022-09-22 RX ORDER — ETONOGESTREL 68 MG/1
1 IMPLANT SUBCUTANEOUS
Qty: 0 | Refills: 0 | DISCHARGE
Start: 2022-09-22

## 2022-09-22 RX ADMIN — Medication 975 MILLIGRAM(S): at 13:59

## 2022-09-22 RX ADMIN — HYDROMORPHONE HYDROCHLORIDE 0.5 MILLIGRAM(S): 2 INJECTION INTRAMUSCULAR; INTRAVENOUS; SUBCUTANEOUS at 15:50

## 2022-09-22 RX ADMIN — HYDROMORPHONE HYDROCHLORIDE 0.5 MILLIGRAM(S): 2 INJECTION INTRAMUSCULAR; INTRAVENOUS; SUBCUTANEOUS at 16:05

## 2022-09-22 NOTE — ASU DISCHARGE PLAN (ADULT/PEDIATRIC) - CARE PROVIDER_API CALL
Dunia Roth; MPH)  Obstetrics and Gynecology  [address] [place] [address], [address]  [place] [address]  Phone: [phone]  Fax: [phone]  Established Patient  Follow Up Time: 2 weeks

## 2022-09-22 NOTE — BRIEF OPERATIVE NOTE - COMMENTS
Dictated by:   Dictation #: Nexplanon Lot #:  Nexplanon Insertion Expiration Date:    Dictated by:   Dictation #: Nexplanon Lot #: [id number]  Nexplanon Insertion Expiration Date: 7/3/2024    Dictated by: Dr Roth  Dictation #: [id number]

## 2022-09-22 NOTE — ASU DISCHARGE PLAN (ADULT/PEDIATRIC) - NS MD DC FALL RISK RISK
For information on Fall & Injury Prevention, visit: [email or web address] OR  [email or web address] OR  https://www.cdc.gov/steadi/patient.html

## 2022-09-22 NOTE — BRIEF OPERATIVE NOTE - OPERATION/FINDINGS
uterus, anteverted, approx 18w size  cervix dilated 2cm upon speculum exam after removal of sponges, 4 laminaria, 1 dilapan  ultrasound-guided evacuation of pregnancy noted  tenaculum site hemostatic  resolution of bleeding upon bimanual massage and pitocin administration    after insertion of Nexplanon in left arm, palpated superficially; hemostatic insertion site

## 2022-09-22 NOTE — ASU DISCHARGE PLAN (ADULT/PEDIATRIC) - DRIVING DURATION DAY(S)
at least 24hrs after receiving anesthesia or while relying on pain medication; reaction times may be slowed

## 2022-09-22 NOTE — BRIEF OPERATIVE NOTE - NSICDXBRIEFPROCEDURE_GEN_ALL_CORE_FT
PROCEDURES:  Dilation and evacuation of uterus using suction with ultrasound guidance 22-Sep-2022 14:16:55  Sanam Marlow

## 2022-09-26 LAB — SURGICAL PATHOLOGY STUDY: SIGNIFICANT CHANGE UP

## 2022-10-06 ENCOUNTER — APPOINTMENT (OUTPATIENT)
Dept: OBGYN | Facility: CLINIC | Age: 32
End: 2022-10-06

## 2025-01-10 ENCOUNTER — RESULT REVIEW (OUTPATIENT)
Age: 35
End: 2025-01-10

## 2025-07-20 NOTE — PATIENT PROFILE OB - NSALCOHOLLASTUSE_GEN_A_CORE_DT
This RN helped patient to change into green gown. Patient belongings collected and placed in personal belongings bag and placed on Shel[address] in cabinet. Per Vickey Najera, patient does not need to be on cardiac monitor in the ER.     Sitter is present, telepsych computer #2 at the bedside. Patient resting comfortably at this time.   10-Mar-2018